# Patient Record
Sex: MALE | Race: WHITE
[De-identification: names, ages, dates, MRNs, and addresses within clinical notes are randomized per-mention and may not be internally consistent; named-entity substitution may affect disease eponyms.]

---

## 2018-05-04 ENCOUNTER — HOSPITAL ENCOUNTER (EMERGENCY)
Dept: HOSPITAL 74 - JER | Age: 43
Discharge: HOME | End: 2018-05-04
Payer: COMMERCIAL

## 2018-05-04 VITALS — TEMPERATURE: 98.8 F | HEART RATE: 76 BPM | DIASTOLIC BLOOD PRESSURE: 78 MMHG | SYSTOLIC BLOOD PRESSURE: 137 MMHG

## 2018-05-04 VITALS — BODY MASS INDEX: 29.9 KG/M2

## 2018-05-04 DIAGNOSIS — R16.0: ICD-10-CM

## 2018-05-04 DIAGNOSIS — R10.11: ICD-10-CM

## 2018-05-04 DIAGNOSIS — K76.0: ICD-10-CM
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
## 2018-05-04 DIAGNOSIS — F10.10: Primary | ICD-10-CM

## 2018-05-04 LAB
ALBUMIN SERPL-MCNC: 4.3 G/DL (ref 3.4–5)
ALP SERPL-CCNC: 120 U/L (ref 45–117)
ALT SERPL-CCNC: 263 U/L (ref 12–78)
ANION GAP SERPL CALC-SCNC: 13 MMOL/L (ref 8–16)
AST SERPL-CCNC: 634 U/L (ref 15–37)
BASOPHILS # BLD: 1 % (ref 0–2)
BILIRUB SERPL-MCNC: 1.2 MG/DL (ref 0.2–1)
BUN SERPL-MCNC: 7 MG/DL (ref 7–18)
CALCIUM SERPL-MCNC: 8.4 MG/DL (ref 8.5–10.1)
CHLORIDE SERPL-SCNC: 104 MMOL/L (ref 98–107)
CO2 SERPL-SCNC: 25 MMOL/L (ref 21–32)
CREAT SERPL-MCNC: 0.8 MG/DL (ref 0.7–1.3)
DEPRECATED RDW RBC AUTO: 17.4 % (ref 11.9–15.9)
EOSINOPHIL # BLD: 2 % (ref 0–4.5)
GLUCOSE SERPL-MCNC: 85 MG/DL (ref 74–106)
HCT VFR BLD CALC: 38.6 % (ref 35.4–49)
HGB BLD-MCNC: 13.4 GM/DL (ref 11.7–16.9)
LYMPHOCYTES # BLD: 34.2 % (ref 8–40)
MCH RBC QN AUTO: 33.5 PG (ref 25.7–33.7)
MCHC RBC AUTO-ENTMCNC: 34.7 G/DL (ref 32–35.9)
MCV RBC: 96.8 FL (ref 80–96)
MONOCYTES # BLD AUTO: 7.7 % (ref 3.8–10.2)
NEUTROPHILS # BLD: 55.1 % (ref 42.8–82.8)
PLATELET # BLD AUTO: 135 K/MM3 (ref 134–434)
PMV BLD: 8 FL (ref 7.5–11.1)
POTASSIUM SERPLBLD-SCNC: 3.8 MMOL/L (ref 3.5–5.1)
PROT SERPL-MCNC: 7.7 G/DL (ref 6.4–8.2)
RBC # BLD AUTO: 3.99 M/MM3 (ref 4–5.6)
SODIUM SERPL-SCNC: 142 MMOL/L (ref 136–145)
WBC # BLD AUTO: 3.8 K/MM3 (ref 4–10)

## 2018-05-04 PROCEDURE — 3E0337Z INTRODUCTION OF ELECTROLYTIC AND WATER BALANCE SUBSTANCE INTO PERIPHERAL VEIN, PERCUTANEOUS APPROACH: ICD-10-PCS

## 2018-05-04 NOTE — PDOC
History of Present Illness





- General


Chief Complaint: Pain


Stated Complaint: ABD PAIN


Time Seen by Provider: 05/04/18 17:17


History Source: Patient


Exam Limitations: No Limitations





- History of Present Illness


Initial Comments: 





CHIEF COMPLAINT:  43 y/o afebrile male, chronic alcoholic with PMH HTN (

uncontrolled) c/o abdominal pain today. 





HISTORY OF PRESENT ILLNESS:  The patient admits he's been drinking alcohol all 

day and has finished an entire bottle of vodka.  He states his stomach started 

hurting when he woke up this morning.  He denies f/c, n/v/d, CP, SOB, back pain

, hematuria, dysuria.  He states his last BM was this morning. 





Vital signs on arrival are 





REVIEW OF SYSTEMS:


GENERAL/CONSTITUTIONAL: No fever/chills. No weakness. No weight change.


HEAD, EYES, EARS, NOSE AND THROAT: No change in vision. No ear pain or 

discharge. No sore throat.


CARDIOVASCULAR: No chest pain or shortness of breath.


RESPIRATORY: No cough, wheezing, or hemoptysis.


GASTROINTESTINAL: +abdominal pain.  No nausea, vomiting, diarrhea, constipation.


GENITOURINARY: No dysuria, frequency, or change in urination.


MUSCULOSKELETAL: No joint or muscle swelling or pain. No neck or back pain.


SKIN: No rash or easy bruising.


NEUROLOGIC: No headache, vertigo, loss of consciousness, or loss of sensation.








PHYSICAL EXAM:


GENERAL: The patient is awake, intoxicated, in NAD or obvious discomfort.  He 

is ambulatory with normal gait. 


HEAD: Normal with no signs of trauma.


ENT: Pupils equal, round and reactive to light, extraocular movements intact, 

sclera anicteric, conjunctiva clear. Neck supple.


LUNGS: Clear to auscultation bilaterally. Normal excursion. No respiratory 

distress or use of accessory muscles.


CV: RRR, S1/S2, no MRG. Cap refill < 2 sec.


ABDOMEN: Soft, non-distended, minimal TTP of middle abdomen.  Negative early's 

sign.  No McBurney's point TTP.  No rebound or guarding.  Normal BS x 4.


EXTREMITIES: Normal range of motion, no edema.


NEUROLOGICAL: Normal speech, normal gait. CN II-XII grossly intact.


PSYCH: Normal mood, normal affect.


SKIN: Warm, dry, normal turgor, no rashes or lesions noted.











Past History





- Past Medical History


Allergies/Adverse Reactions: 


 Allergies











Allergy/AdvReac Type Severity Reaction Status Date / Time


 


No Known Allergies Allergy   Verified 05/04/18 18:06











Home Medications: 


Ambulatory Orders





Clonazepam [Klonopin] 1 mg PO DAILY 05/04/18 








Psychiatric Problems: Yes (ANXIETY/DEPRESSION)





- Surgical History


Abdominal Surgery: Yes


Appendectomy: Yes





- Suicide/Smoking/Psychosocial Hx


Smoking Status: Yes


Smoking History: Current every day smoker


Have you smoked in the past 12 months: No


Number of Cigarettes Smoked Daily: 20


Hx Alcohol Use: Yes (SOCIAL)


Substance Use Type: Alcohol





ED Treatment Course





- LABORATORY


CBC & Chemistry Diagram: 


 05/04/18 18:10





 05/04/18 18:10





Medical Decision Making





- Medical Decision Making





A/P:  43 y/o male, intoxicated, c/o abdominal pain today with benign abdominal 

exam.  Plan is as follows:





1. Labs


2. IV fluids


3. abd xray





The patient was signed out to [facility]














*DC/Admit/Observation/Transfer


Diagnosis at time of Disposition: 


 ETOH abuse, Abdominal pain








- Discharge Dispostion


Disposition: HOME


Condition at time of disposition: Stable





- Referrals


Referrals: 


Rick Bowen MD [Staff Physician] - 


Francisco Owen [Non Staff, Medical] - 





- Patient Instructions


Printed Discharge Instructions:  Blood Alcohol Level, DI for Abdominal Pain-

Adult


Additional Instructions: 


Follow with the gastroenterologist within 48 hours





Avoid alcohol





Abdominal ultrasound today does not show any gallstones or gallbladder infection





Return back to the ER for severe/persistent or worsening symptoms





- Post Discharge Activity

## 2018-05-04 NOTE — PDOC
*Physical Exam





- Vital Signs


 Last Vital Signs











Temp Pulse Resp BP Pulse Ox


 


 98.3 F   88   18   133/84   100 


 


 05/04/18 17:15  05/04/18 17:15  05/04/18 17:15  05/04/18 17:15  05/04/18 17:15














- Physical Exam


Comments: 





05/04/18 20:34


42-year-old male presents to the ER complaining of right upper abdominal 

discomfort after drinking an entire bottle of vodka today. Patient denies nausea

/vomiting, fever/chills, dizziness, lightheadedness, facial pains, neck pain/

stiffness, back pains, chest pain, shortness of breath, back pains, urinary 

symptoms.


Patient states he feels a bit better now.





 





ED Treatment Course





- LABORATORY


CBC & Chemistry Diagram: 


 05/04/18 18:10





 05/04/18 18:10





- ADDITIONAL ORDERS


Additional order review: 


 Laboratory  Results











  05/04/18 05/04/18





  18:10 18:10


 


Sodium   142


 


Potassium   3.8


 


Chloride   104


 


Carbon Dioxide   25


 


Anion Gap   13


 


BUN   7


 


Creatinine   0.8


 


Creat Clearance w eGFR   > 60


 


Random Glucose   85


 


Calcium   8.4 L


 


Total Bilirubin   1.2 H


 


AST   634 H


 


ALT   263 H


 


Alkaline Phosphatase   120 H


 


Total Protein   7.7


 


Albumin   4.3


 


Lipase  942 H 








 











  05/04/18





  18:10


 


RBC  3.99 L


 


MCV  96.8 H


 


MCHC  34.7


 


RDW  17.4 H


 


MPV  8.0


 


Neutrophils %  55.1


 


Lymphocytes %  34.2


 


Monocytes %  7.7


 


Eosinophils %  2.0


 


Basophils %  1.0














- RADIOLOGY


Radiograph Interpretation: 





05/04/18 20:35


Abdominal ultrasound: No sonographic evidence of cholelithiasis or acute 

cholecystitis. There is no definite biliary tract dilatation. Diffuse hepatic 

steatosis is noted. Minimal to mild associated hepatomegaly is seen.





- Medications


Given in the ED: 


ED Medications














Discontinued Medications














Generic Name Dose Route Start Last Admin





  Trade Name Freq  PRN Reason Stop Dose Admin


 


Sodium Chloride  1,000 mls @ 1,000 mls/hr  05/04/18 17:30  05/04/18 18:09





  Normal Saline -  IV  05/04/18 18:29  1,000 mls/hr





  ASDIR STA   Administration














Progress Note





- Progress Note


Progress Note: 





GENERAL:


Well developed, well nourished. Awake and alert. No acute distress.


HEENT:


Normocephalic, atraumatic. PERRLA, EOMI. No conjunctival pallor. Sclera are non-

icteric. Moist mucous membranes. Oropharynx is clear.


NECK: 


Supple. Full ROM. No JVD. Carotid pulses 2+ and symmetric, without bruits. No 

thyromegaly. No lymphadenopathy.


CARDIOVASCULAR:


Regular rate and rhythm. No murmurs, rubs, or gallops. Distal pulses are 2+ and 

symmetric. 


PULMONARY: 


No evidence of respiratory distress. Lungs clear to auscultation bilaterally. 

No wheezing, rales or rhonchi.


ABDOMINAL:


Soft. Non-tender. Non-distended. No rebound or guarding. No organomegaly. 

Normoactive bowel sounds. 


MUSCULOSKELETAL 


Normal range of motion at all joints. No bony deformities or tenderness. No CVA 

tenderness.


EXTREMITIES: 


No cyanosis. No clubbing. No edema. No calf tenderness.


SKIN: 


Warm and dry. Normal capillary refill. No rashes. No jaundice. 


NEUROLOGICAL: 


Alert, awake, appropriate. Cranial nerves 2-12 intact. No deficits to light 

touch and temperature in face, upper extremities and lower extremities. No 

motor deficits in the in face, upper extremities and lower extremities. 

Normoreflexic in the upper and lower extremities. Normal speech. Toes are down-

going bilaterally. Gait is normal without ataxia.


PSYCHIATRIC: 


Cooperative. Good eye contact. Appropriate mood and affect.





*DC/Admit/Observation/Transfer


Diagnosis at time of Disposition: 


 ETOH abuse





Abdominal pain


Qualifiers:


 Abdominal location: right upper quadrant Qualified Code(s): R10.11 - Right 

upper quadrant pain








- Discharge Dispostion


Disposition: HOME


Condition at time of disposition: Stable


Admit: No





- Referrals


Referrals: 


Francisco Owen [Non Staff, Medical] - 


Rick Bowen MD [Staff Physician] - 





- Patient Instructions


Printed Discharge Instructions:  Blood Alcohol Level, DI for Abdominal Pain-

Adult


Additional Instructions: 


Follow with the gastroenterologist within 48 hours





Avoid alcohol





Abdominal ultrasound today does not show any gallstones or gallbladder infection





Return back to the ER for severe/persistent or worsening symptoms





- Post Discharge Activity

## 2018-05-04 NOTE — PDOC
ED Treatment Course





- LABORATORY


CBC & Chemistry Diagram: 


 05/04/18 18:10





 05/04/18 18:10





Medical Decision Making





- Medical Decision Making





05/04/18 18:02





Mr Moe is a 43 y/o male, HTN, alcoholism who presents to the ER with a 

complaint of abdominal pain today. 


Drank an entire bottle of vodka


No fevers no chills





Pt seen by Midlevel Provider under my direct supervision


Ancillary studies pending


I agree with plan as outlined by Midlevel Provider


EKG:


SR rate of 84 bpm, axis nml, no st elevations or depressions


Pt signed out to overnight [facility]











*DC/Admit/Observation/Transfer


Diagnosis at time of Disposition: 


 ETOH abuse, Abdominal pain








- Discharge Dispostion


Disposition: HOME


Condition at time of disposition: Stable





- Referrals


Referrals: 


Rick Bowen MD [Staff Physician] - 


Francisco Owen [Non Staff, Medical] - 





- Patient Instructions


Printed Discharge Instructions:  Blood Alcohol Level, DI for Abdominal Pain-

Adult


Additional Instructions: 


Follow with the gastroenterologist within 48 hours





Avoid alcohol





Abdominal ultrasound today does not show any gallstones or gallbladder infection





Return back to the ER for severe/persistent or worsening symptoms





- Post Discharge Activity

## 2018-05-09 NOTE — EKG
Test Reason : 

Blood Pressure : ***/*** mmHG

Vent. Rate : 084 BPM     Atrial Rate : 084 BPM

   P-R Int : 116 ms          QRS Dur : 088 ms

    QT Int : 358 ms       P-R-T Axes : 008 077 067 degrees

   QTc Int : 423 ms

 

NORMAL SINUS RHYTHM

SEPTAL INFARCT , AGE UNDETERMINED

ABNORMAL ECG

WHEN COMPARED WITH ECG OF 09-FEB-2008 10:27,

SEPTAL INFARCT IS NOW PRESENT

Confirmed by MARGARETH ADAMS, REEMA ([id number]) on 5/9/2018 1:15:33 PM

 

Referred By:             Confirmed By:REEMA ZULUAGA MD

## 2019-03-09 ENCOUNTER — HOSPITAL ENCOUNTER (EMERGENCY)
Dept: HOSPITAL 74 - JER | Age: 44
Discharge: LEFT BEFORE DISCHARGE | End: 2019-03-09
Payer: COMMERCIAL

## 2019-03-09 VITALS — SYSTOLIC BLOOD PRESSURE: 140 MMHG | DIASTOLIC BLOOD PRESSURE: 85 MMHG

## 2019-03-09 VITALS — TEMPERATURE: 98.9 F

## 2019-03-09 VITALS — HEART RATE: 98 BPM

## 2019-03-09 VITALS — BODY MASS INDEX: 25.7 KG/M2

## 2019-03-09 DIAGNOSIS — F10.20: ICD-10-CM

## 2019-03-09 DIAGNOSIS — G40.509: Primary | ICD-10-CM

## 2019-03-09 LAB
ALBUMIN SERPL-MCNC: 4.4 G/DL (ref 3.4–5)
ALP SERPL-CCNC: 67 U/L (ref 45–117)
ALT SERPL-CCNC: 63 U/L (ref 13–61)
ANION GAP SERPL CALC-SCNC: 11 MMOL/L (ref 8–16)
AST SERPL-CCNC: 71 U/L (ref 15–37)
BASOPHILS # BLD: 0.5 % (ref 0–2)
BILIRUB SERPL-MCNC: 0.5 MG/DL (ref 0.2–1)
BUN SERPL-MCNC: 8 MG/DL (ref 7–18)
CALCIUM SERPL-MCNC: 8.9 MG/DL (ref 8.5–10.1)
CHLORIDE SERPL-SCNC: 104 MMOL/L (ref 98–107)
CO2 SERPL-SCNC: 25 MMOL/L (ref 21–32)
CREAT SERPL-MCNC: 0.8 MG/DL (ref 0.55–1.3)
DEPRECATED RDW RBC AUTO: 14.4 % (ref 11.9–15.9)
EOSINOPHIL # BLD: 1.9 % (ref 0–4.5)
GLUCOSE SERPL-MCNC: 79 MG/DL (ref 74–106)
HCT VFR BLD CALC: 48.5 % (ref 35.4–49)
HGB BLD-MCNC: 17.1 GM/DL (ref 11.7–16.9)
LIPASE SERPL-CCNC: 258 U/L (ref 73–393)
LYMPHOCYTES # BLD: 27.2 % (ref 8–40)
MCH RBC QN AUTO: 34.9 PG (ref 25.7–33.7)
MCHC RBC AUTO-ENTMCNC: 35.2 G/DL (ref 32–35.9)
MCV RBC: 99.1 FL (ref 80–96)
MONOCYTES # BLD AUTO: 8.5 % (ref 3.8–10.2)
NEUTROPHILS # BLD: 61.9 % (ref 42.8–82.8)
PLATELET # BLD AUTO: 220 K/MM3 (ref 134–434)
PMV BLD: 7.9 FL (ref 7.5–11.1)
POTASSIUM SERPLBLD-SCNC: 3.7 MMOL/L (ref 3.5–5.1)
PROT SERPL-MCNC: 8.1 G/DL (ref 6.4–8.2)
RBC # BLD AUTO: 4.89 M/MM3 (ref 4–5.6)
SODIUM SERPL-SCNC: 139 MMOL/L (ref 136–145)
WBC # BLD AUTO: 6.6 K/MM3 (ref 4–10)

## 2019-03-09 NOTE — PDOC
History of Present Illness





- General


History Source: Patient


Exam Limitations: No Limitations





- History of Present Illness


Initial Comments: 





03/09/19 18:16


The patient is a 43 year old male, with a significant PMH of seizures, anxiety 

and depression, who presents to the emergency department for evaluation of 1 

episode seizure that occurred today. The patient states he was trying to detox 

from alcohol and went to his physician where he thinks he was prescribed 

lorazepam. Patient states he took the medication today and drank 1 pint of Benji 

Dotson  today when the seizure occurred. He endorses associated symptoms of 

abdomen pain, decrease in appetite and experienced 1 episode of vomiting. The 

patient's mother reports that patient is an alcoholic  (drinks 2 pints of Benji 

Uriel a day ) and has experienced seizures in the past secondary to alcohol.  





The patient denies chest pain, shortness of breath, headache and dizziness.


Denies fever, chills, diarrhea and constipation.


Denies dysuria, frequency, urgency and hematuria.





Allergies: NKDA


Past surgical history: Abdominal surgery and appendectomy 


Social history: Current everyday smoker (20 cigarettes daily)  and admits 

alcohol use ( 2 pints Benji Uriel every day)


PCP: Arlene Finn 











<Jaylen Hernandez - Last Filed: 03/09/19 18:19>





<Hilaria Smith - Last Filed: 03/10/19 20:20>





- General


Chief Complaint: Seizure


Stated Complaint: SEIZURE


Time Seen by Provider: 03/09/19 17:25





Past History





<Jaylen Hernandez - Last Filed: 03/09/19 18:19>





- Past Medical History


COPD: No


Psychiatric Problems: Yes (ANXIETY/DEPRESSION)


Seizures: Yes (last seisure 03/09/2019)





- Surgical History


Abdominal Surgery: Yes


Appendectomy: Yes





- Suicide/Smoking/Psychosocial Hx


Smoking Status: Yes


Smoking History: Current every day smoker


Have you smoked in the past 12 months: Yes


Number of Cigarettes Smoked Daily: 20


Information on smoking cessation initiated: No


Hx Alcohol Use: Yes


Drug/Substance Use Hx: Yes


Substance Use Type: Alcohol





<Hilaria Smtih - Last Filed: 03/10/19 20:20>





- Past Medical History


Allergies/Adverse Reactions: 


 Allergies











Allergy/AdvReac Type Severity Reaction Status Date / Time


 


No Known Allergies Allergy   Verified 03/09/19 16:59











Home Medications: 


Ambulatory Orders





Clonazepam [Klonopin] 1 mg PO DAILY 05/04/18 











**Review of Systems





- Review of Systems


Able to Perform ROS?: Yes


Comments:: 





03/09/19 18:17


GENERAL/CONSTITUTIONAL: No fever or chills. No weakness.


HEAD, EYES, EARS, NOSE AND THROAT: No change in vision. No ear pain or 

discharge. No sore throat.


CARDIOVASCULAR: No chest pain or shortness of breath.


RESPIRATORY: No cough, wheezing, or hemoptysis.


GASTROINTESTINAL:+Abdomen pain, +vomiting . No diarrhea or constipation.


GENITOURINARY: No dysuria, frequency, or change in urination.


MUSCULOSKELETAL: No joint or muscle swelling or pain. No neck or back pain.


SKIN: No rash


NEUROLOGIC: +seizure.  No headache, vertigo, loss of consciousness, or change 

in strength/sensation.


ENDOCRINE: No increased thirst. No abnormal weight change.


HEMATOLOGIC/LYMPHATIC: No anemia, easy bleeding, or history of blood clots.


ALLERGIC/IMMUNOLOGIC: No hives or skin allergy.








<Jaylen Hernandez - Last Filed: 03/09/19 18:19>





*Physical Exam





- Vital Signs


 Last Vital Signs











Temp Pulse Resp BP Pulse Ox


 


 98.9 F   98 H  18   143/96   99 


 


 03/09/19 16:50  03/09/19 16:50  03/09/19 16:50  03/09/19 16:50  03/09/19 16:50














- Physical Exam


Comments: 





03/09/19 18:18


GENERAL: Awake, alert, and fully oriented, in no acute distress


HEAD: No signs of trauma


EYES: PERRLA, EOMI, sclera anicteric, conjunctiva clear


ENT: Auricles normal inspection, hearing grossly normal, nares patent, 

oropharynx clear without exudates. Moist mucosa


NECK: Normal ROM, supple, no lymphadenopathy, JVD, or masses


LUNGS: Breath sounds equal, clear to auscultation bilaterally.  No wheezes, and 

no crackles


HEART: Regular rate and rhythm, normal S1 and S2, no murmurs, rubs or gallops


ABDOMEN: +Mild LUQ pain to palpation. Normoactive bowel sounds.  No guarding, 

no rebound.  No masses


EXTREMITIES: Normal range of motion, no edema.  No clubbing or cyanosis. No 

cords, erythema, or tenderness


NEUROLOGICAL: Cranial nerves II through XII grossly intact.  Normal speech, 

normal gait


SKIN: Warm, Dry, normal turgor, no rashes or lesions noted.

















<Jaylen Hernandez - Last Filed: 03/09/19 18:19>





- Vital Signs


 Last Vital Signs











Temp Pulse Resp BP Pulse Ox


 


 98.9 F   98 H  18   143/96   99 


 


 03/09/19 16:50  03/09/19 16:50  03/09/19 16:50  03/09/19 16:50  03/09/19 16:50














<Hilaria Smith - Last Filed: 03/10/19 20:20>





Moderate Sedation





- Procedure Monitoring


Vital Signs: 


Procedure Monitoring Vital Signs











Temperature  98.9 F   03/09/19 16:50


 


Pulse Rate  98 H  03/09/19 16:50


 


Respiratory Rate  18   03/09/19 16:50


 


Blood Pressure  143/96   03/09/19 16:50


 


O2 Sat by Pulse Oximetry (%)  99   03/09/19 16:50











<Jaylen Hernandez - Last Filed: 03/09/19 18:19>





- Procedure Monitoring


Vital Signs: 


Procedure Monitoring Vital Signs











Temperature  98.9 F   03/09/19 16:50


 


Pulse Rate  98 H  03/09/19 16:50


 


Respiratory Rate  18   03/09/19 16:50


 


Blood Pressure  143/96   03/09/19 16:50


 


O2 Sat by Pulse Oximetry (%)  99   03/09/19 16:50











<Hilaria Smith - Last Filed: 03/10/19 20:20>





ED Treatment Course





- Medications


Given in the ED: 


ED Medications














Discontinued Medications














Generic Name Dose Route Start Last Admin





  Trade Name Romainq  PRN Reason Stop Dose Admin


 


Chlordiazepoxide HCl  25 mg  03/09/19 17:35  03/09/19 18:06





  Librium -  PO  03/09/19 17:36  25 mg





  ONCE ONE   Administration





     





     





     





     


 


Sodium Chloride  1,000 ml  03/09/19 17:36  03/09/19 18:06





  Normal Saline -  IV  03/09/19 17:37  1,000 ml





  ONCE ONE   Administration





     





     





     





     














<Jaylen Hernandez - Last Filed: 03/09/19 18:19>





- LABORATORY


CBC & Chemistry Diagram: 


 03/09/19 18:10





 03/09/19 18:10





<Hilaria Smith - Last Filed: 03/10/19 20:20>





Medical Decision Making





- Medical Decision Making





03/09/19 18:38


Pt presents to the ED complaining of seizure today.  Pt is an alcoholic who was 

recently started on an unknown medication for outpatient detox by his PMD.  All 

seizures in the past have been secondary to ETOH withdrawal.  No signs of 

active withdrawal at this time, but given history of seizure, will treat with 

librium.  If patient's symptoms are controlled by librium, will transfer to 

[facility] for in patient detox.  If he requires IV medications, will admit to 

medicine. 


03/09/19 20:34





Still has no signs of active withdrawal.  Case discussed with [facility], pt is 

accepted for inpatient detox tomorrow AM.





Send to [facility].  


cell Phone [phone]





<Hilaria Smith - Last Filed: 03/10/19 20:20>





*DC/Admit/Observation/Transfer





- Attestations


Scribe Attestion: 





03/09/19 18:18





Documentation prepared by Jaylen Hernandez, acting as medical scribe for Hilaria Smith MD. 





<Jaylen Hernandez - Last Filed: 03/09/19 18:19>





<Hilaria Smith - Last Filed: 03/10/19 20:20>


Diagnosis at time of Disposition: 


 Seizure








- Discharge Dispostion


Disposition: ELOPED





- Referrals


Referrals: 


Arlene Finn MD [Primary Care Provider] - 





- Patient Instructions





- Post Discharge Activity

## 2019-03-11 ENCOUNTER — HOSPITAL ENCOUNTER (EMERGENCY)
Dept: HOSPITAL 74 - JER | Age: 44
Discharge: HOME | End: 2019-03-11
Payer: COMMERCIAL

## 2019-03-11 VITALS — SYSTOLIC BLOOD PRESSURE: 121 MMHG | DIASTOLIC BLOOD PRESSURE: 83 MMHG | HEART RATE: 102 BPM | TEMPERATURE: 98.4 F

## 2019-03-11 VITALS — BODY MASS INDEX: 25.7 KG/M2

## 2019-03-11 DIAGNOSIS — G40.509: ICD-10-CM

## 2019-03-11 DIAGNOSIS — F10.120: Primary | ICD-10-CM

## 2019-03-11 NOTE — PDOC
Rapid Medical Evaluation


Time Seen by Provider: 03/11/19 15:02


Medical Evaluation: 


 Allergies











Allergy/AdvReac Type Severity Reaction Status Date / Time


 


No Known Allergies Allergy   Verified 03/09/19 16:59











03/11/19 15:03


I have performed a brief in-person evaluation of this patient. 





The patient presents with a chief complaint of





alcohol intoxication and possible seizure.  Patient seen here last week 


for intoxication and seizure, walked out because he states no one was tending


to him.  He was called this am and states he would like help, to go to rehab.  

Thinks 


he possibly had a seizure today.  Admits to 1 pint of alcohol today





Pertinent physical exam findings


NAD


flat affect


even and unlabored breathing 


+right upper and lower abdominal tenderness





I have ordered the following


labs 


The patient will proceed to the ED for further evaluation.

## 2019-03-11 NOTE — PDOC
History of Present Illness





- General


Chief Complaint: Seizure


Stated Complaint: SEIZURE


Time Seen by Provider: 03/11/19 15:02





- History of Present Illness


Initial Comments: 


Eddie Moe is a 42yo man with a PMH of alcoholism, depression, and 

alcoholic seizure who presents requesting alcohol detox today. His mother is at 

bedside. 





Mr Moe states that he was here on Saturday but "no one saw him" so he "got 

fed up" and left the ED. His mother states that he was here for "8 hours and no 

one would even give him a glass of water." He went home and continued to drink 

his usual 2 pints per day. He is concerned that he had a seizure this morning 

becuase he appears to have lost some time. He was drinking, about a pint total 

so far, and had started to smoke a cigarette. The next thing he remembers, his 

cigarette had gone out and he was laying on the floor. However, he denies any 

injury, head trauma, known mechanical fall, bowel/bladder incontinence or any 

other symptoms. The event was unwitnessed and occurred several hours ago. 





Mr Moe denies any other recent symptoms. He states that in the past, he 

used to drink at least a large bottle of liquor daily (1L bottle), but he had 

cut back to 1 pint per day. This has slowly increased over time to 

approximately 2 pints per day at this point. He now starts to feel shaky when 

he wakes up in the morning after not drinking for 7-8 hours. He has never gone 

to rehab in the past, though he does not a history of drug abuse until 2 years 

ago (coke and "pills").








Past History





- Past Medical History


Allergies/Adverse Reactions: 


 Allergies











Allergy/AdvReac Type Severity Reaction Status Date / Time


 


No Known Allergies Allergy   Verified 03/11/19 15:05











Home Medications: 


Ambulatory Orders





Clonazepam [Klonopin] 1 mg PO DAILY 05/04/18 








COPD: No


Psychiatric Problems: Yes (ANXIETY/DEPRESSION)


Seizures: Yes (last seisure 03/09/2019)





- Surgical History


Abdominal Surgery: Yes


Appendectomy: Yes





- Suicide/Smoking/Psychosocial Hx


Smoking Status: Yes


Smoking History: Never smoked


Have you smoked in the past 12 months: Yes


Number of Cigarettes Smoked Daily: 20


Information on smoking cessation initiated: No


Hx Alcohol Use: No


Drug/Substance Use Hx: No


Substance Use Type: Alcohol





**Review of Systems





- Review of Systems


Comments:: 


General: No fevers, no chills, no weight or appetite change, no malaise


HEENT: No changes in vision, no changes in hearing, no congestion, no sore 

throat


CV: No chest pain, no palpitations, no LE edema


Pulm: No SOB, no cough, no wheezing


GI: No nausea or vomiting, no change in bowel habits, no melena


: No frequency, no urgency, no dysuria


Musc: No back pain, no joint swelling, no recent injury


Skin: No rash, no lesions, no erythema


Endo: No excessive thirst, no heat/cold intolerance


Heme: No unusual bruising or bleeding, no swollen glands


Neuro: No syncope, no numbness/tingling, no focal weakness


Vasc: No claudication


Psych: No recent change in mood, no SI or HI. h/o alcohol abuse











*Physical Exam





- Vital Signs


 Last Vital Signs











Temp Pulse Resp BP Pulse Ox


 


 98.4 F   102 H  17   121/83   97 


 


 03/11/19 15:05  03/11/19 15:05  03/11/19 15:05  03/11/19 15:05  03/11/19 15:05














- Physical Exam


Comments: 


General: No acute distress. Smells strongly of alcohol and cigarettes. 


HEENT: PERRL, EOMI, MMM, voice normal, normal neck ROM, no LAD


Cards: RRR, no murmur appreciated


Pulm: Comfortable on room air, clear to auscultation bilaterally


Abd: Soft, nontender, nondistended


Ext: Atraumatic. No LE edema. ROM intact. Strength 5/5 and equal bilaterally


Vasc: Extremities WWP.


Skin: Normal color, no rashes or lesions


Neuro: A&Ox3, CN grossly intact, normal speech, motor/sensory grossly intact 

and symmetric


Psych: Mood appropriate to situation











Moderate Sedation





- Procedure Monitoring


Vital Signs: 


Procedure Monitoring Vital Signs











Temperature  98.4 F   03/11/19 15:05


 


Pulse Rate  102 H  03/11/19 15:05


 


Respiratory Rate  17   03/11/19 15:05


 


Blood Pressure  121/83   03/11/19 15:05


 


O2 Sat by Pulse Oximetry (%)  97   03/11/19 15:05











Medical Decision Making





- Medical Decision Making





03/11/19 15:50


Eddie Moe is a 42yo man with a PMH of alcoholism, depression, and seizure 

secondary to alcohol who presents today requesting detox. He also states that 

he feels he may have had a seizure today but is not sure. He suspects a seizure 

only because his cigarette went out while he was drinking. He felt that he lost 

time and must have had a seizure. However, he has no injury and did not have 

any incontinence or other associated symptoms. 


- Appears stable, VSS, observed ambulating without difficulty. No current 

physical complaints. Medically cleared for d/c to detox. 


- No indication that Mr Moe had a seizure. More likely he did not notice 

his cigarette going out due to drinking 1 pint of alcohol this morning.


- Called [facility]. There are detox beds available, and they do not need labs 

drawn prior to transfer


- Will contact security to take him to detox.





Discussed with Dr Baltazar Sargent


PGY1











*DC/Admit/Observation/Transfer


Diagnosis at time of Disposition: 


 ETOH abuse








- Discharge Dispostion


Disposition: HOME


Condition at time of disposition: Stable


Decision to Admit order: No





- Referrals





- Patient Instructions


Printed Discharge Instructions:  DI for Alcohol Abuse


Additional Instructions: 


Discharge Instructions:


You were seen in the emergency department for alcohol abuse.


You were discharged so that you can go to detox. It is strongly recommended 

that you also go to rehab following detox. 


Please proceed directly to [facility] for detox. 





- Post Discharge Activity

## 2019-03-11 NOTE — PDOC
Attending Attestation





- Resident


Resident Name: RosettaJoselyn





- ED Attending Attestation


I have performed the following: I have examined & evaluated the patient, The 

case was reviewed & discussed with the resident, I agree w/resident's findings 

& plan, Exceptions are as noted





- HPI


HPI: 





44 yo M presents s/p suspected seizure. He has history of EtOH abuse, prior 

history of withdrawal seizures. He has gone through detox in the past, but 

never rehab. He typically drinks heavily every day, develops seizures when he 

stops. Denies any tremors at present.








- Physicial Exam


PE: 





GENERAL: Awake, alert, and fully oriented, in no acute distress


HEAD: No signs of trauma


EYES: PERRLA, EOMI, sclera anicteric, conjunctiva clear


ENT: Auricles normal inspection, hearing grossly normal, nares patent, 

oropharynx clear without exudates. Moist mucosa. No tongue fasciculations.


NECK: Normal ROM, supple, no lymphadenopathy, JVD, or masses


LUNGS: Breath sounds equal, clear to auscultation bilaterally.  No wheezes, and 

no crackles


HEART: Regular rate and rhythm, normal S1 and S2, no murmurs, rubs or gallops


ABDOMEN: Soft, nontender, normoactive bowel sounds.  No guarding, no rebound.  

No masses


EXTREMITIES: Normal range of motion, no edema.  No clubbing or cyanosis. No 

cords, erythema, or tenderness. No tremors. 


NEUROLOGICAL: Cranial nerves II through XII grossly intact.  Normal speech, 

normal gait. Motor and sensation intact


SKIN: Warm, Dry, normal turgor, no rashes or lesions noted.








- Medical Decision Making





Pt with no signs of withdrawal at present (however, he was drinking just PTA). 

Stable gait in ED. Agrees to go to [facility] for detox/rehab.

## 2020-01-02 ENCOUNTER — HOSPITAL ENCOUNTER (INPATIENT)
Dept: HOSPITAL 74 - JER | Age: 45
LOS: 4 days | Discharge: HOME | End: 2020-01-06
Attending: FAMILY MEDICINE | Admitting: FAMILY MEDICINE
Payer: COMMERCIAL

## 2020-01-02 VITALS — BODY MASS INDEX: 25.3 KG/M2

## 2020-01-02 DIAGNOSIS — G35: ICD-10-CM
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
## 2020-01-02 DIAGNOSIS — F10.230: ICD-10-CM

## 2020-01-02 DIAGNOSIS — F10.220: Primary | ICD-10-CM

## 2020-01-02 DIAGNOSIS — F17.210: ICD-10-CM

## 2020-01-02 DIAGNOSIS — F41.8: ICD-10-CM

## 2020-01-02 LAB
ALBUMIN SERPL-MCNC: 4.5 G/DL (ref 3.4–5)
ALP SERPL-CCNC: 71 U/L (ref 45–117)
ALT SERPL-CCNC: 29 U/L (ref 13–61)
AMPHET UR-MCNC: NEGATIVE NG/ML
ANION GAP SERPL CALC-SCNC: 8 MMOL/L (ref 8–16)
AST SERPL-CCNC: 22 U/L (ref 15–37)
BARBITURATES UR-MCNC: NEGATIVE NG/ML
BENZODIAZ UR SCN-MCNC: NEGATIVE NG/ML
BILIRUB SERPL-MCNC: 0.5 MG/DL (ref 0.2–1)
BUN SERPL-MCNC: 7.6 MG/DL (ref 7–18)
CALCIUM SERPL-MCNC: 9.1 MG/DL (ref 8.5–10.1)
CHLORIDE SERPL-SCNC: 106 MMOL/L (ref 98–107)
CO2 SERPL-SCNC: 29 MMOL/L (ref 21–32)
COCAINE UR-MCNC: NEGATIVE NG/ML
CREAT SERPL-MCNC: 0.9 MG/DL (ref 0.55–1.3)
DEPRECATED RDW RBC AUTO: 15.4 % (ref 11.9–15.9)
GLUCOSE SERPL-MCNC: 85 MG/DL (ref 74–106)
HCT VFR BLD CALC: 44.3 % (ref 35.4–49)
HGB BLD-MCNC: 15.1 GM/DL (ref 11.7–16.9)
MCH RBC QN AUTO: 32 PG (ref 25.7–33.7)
MCHC RBC AUTO-ENTMCNC: 34.2 G/DL (ref 32–35.9)
MCV RBC: 93.8 FL (ref 80–96)
METHADONE UR-MCNC: NEGATIVE NG/ML
OPIATES UR QL SCN: NEGATIVE NG/ML
PCP UR QL SCN: NEGATIVE NG/ML
PLATELET # BLD AUTO: 218 K/MM3 (ref 134–434)
PMV BLD: 8.4 FL (ref 7.5–11.1)
POTASSIUM SERPLBLD-SCNC: 3.9 MMOL/L (ref 3.5–5.1)
PROT SERPL-MCNC: 8.1 G/DL (ref 6.4–8.2)
RBC # BLD AUTO: 4.72 M/MM3 (ref 4–5.6)
SODIUM SERPL-SCNC: 142 MMOL/L (ref 136–145)
WBC # BLD AUTO: 7 K/MM3 (ref 4–10)

## 2020-01-02 RX ADMIN — NICOTINE SCH MG: 7 PATCH TRANSDERMAL at 22:48

## 2020-01-02 NOTE — PDOC
Documentation entered by Rosemary Yun SCRIBE, acting as scribe for Carolynn Workman DO.








Carolynn Workman, DO:  This documentation has been prepared by the Jama marquez Joy, SCRIBE, under my direction and personally reviewed by me in its entirety.  

I confirm that the documentation accurately reflects all work, treatment, 

procedures, and medical decision making performed by me.  





Attending Attestation





- Resident


Resident Name: Cheikh Andino





- ED Attending Attestation


I have performed the following: I have examined & evaluated the patient, The 

case was reviewed & discussed with the resident, I agree w/resident's findings 

& plan, Exceptions are as noted





- HPI


HPI: 





01/02/20 18:57


The patient is a 44 year old male with significant past medical history of 

alcoholism, depression, and alcoholic seizure who presents to the ED with need 

of alcohol detox. As per patient, he has been drinking at least a pint per day 

for the last x3 weeks. Patient was seen by Dr. Finn and was advised to come 

to the ED for alcohol detox.





Allergies: NKA





- Physicial Exam


PE: 





01/02/20 19:20


Gen: aaox3, anxious appearing


heent: MMM, no tongue fasciculations


neck: supple


heart: +s1s2 reg


lungs: cta b/l


abd: soft, nt/nd +bs


ext: no c/c/e, mild hand tremors


neuro: cn ii-xii grossly intact, moves all extremities, muscle strength 5/5 UE 

and LE, decreased sensation LUE and LLE





- Medical Decision Making





01/02/20 19:23


a/p: 43yo male with new dx of MS 2 weeks ago at [facility] with recent 

etoh abuse- pt drinking a pint of alcohol a day x 2 weeks requesting detox


-pt also states he feels his numbness is worsening


-pt was at Dr. Finn office and told to come to the ER for admission


-pt states last drink was today


-hx of alcohol abuse in the past


-will send labs, ekg, ativan, banana bag


-will monitor and reassess


01/02/20 19:43


cxr clear


01/02/20 20:28


pt with etoh >200


labs reviewed


microblog sent to [facility] for admission


01/02/20 21:01


resident discussed the case with [facility] who accept pt to service

## 2020-01-02 NOTE — PDOC
Rapid Medical Evaluation


Chief Complaint: Alcohol intoxication


Time Seen by Provider: 01/02/20 15:35


Medical Evaluation: 


 Allergies











Allergy/AdvReac Type Severity Reaction Status Date / Time


 


No Known Allergies Allergy   Verified 03/11/19 15:05











01/02/20 15:36


I have performed a brief in-person evaluation of this patient.





The patient presents with a chief complaint of: h/o alcohol abuse present due 

to his PCP Dr. Finn advised him to come to ED for intoxication. pt report 

last alcohol 10 mins ago. pt drinks 2 pints of vodka a day according to pt. pt 

wants detox





Pertinent physical exam findings: A&O in NAD





I have ordered the following: nothing





The patient will proceed to the ED for further evaluation.











**Discharge Disposition





- Diagnosis


 ETOH abuse








- Discharge Dispostion


Condition at time of disposition: Stable





- Referrals





- Patient Instructions





- Post Discharge Activity

## 2020-01-02 NOTE — HP
Admitting History and Physical





- Primary Care Physician


PCP: Dr. Finn 





- Admission


Chief Complaint: Alcohol abuse


History of Present Illness: 





44 year old male with PMHx of  EtOH use disorder, depression, newly diagnosed 

MS presents referred by Dr. Finn for admission for detox, further inpatient 

neuro management of newly diagnosed MS. According to patient he has been 

drinking 1 pint of vodka daily for past 2 weeks after being diagnosed with MS, 

now c/o of hand shaking, nausea, anxious, feeling urge to drink interested in 

detox. Patient denies SOB/CP, V/C/D, no urinary symptoms. 





History Source: Patient


Limitations to Obtaining History: No Limitations





- Past Medical History


CNS: Yes: Multiple Sclerosis


Psych: Yes: Addictions (alcohol abuse), Anxiety, Depression





- Past Surgical History


Past Surgical History: Yes: Appendectomy





- Smoking History


Smoking history: Current every day smoker


Have you smoked in the past 12 months: Yes


Aproximately how many cigarettes per day: 20





- Alcohol/Substance Use


Hx Alcohol Use: Yes


History of Substance Use: reports: None





- Social History


ADL: Independent


History of Recent Travel: No





Home Medications





- Allergies


Allergies/Adverse Reactions: 


 Allergies











Allergy/AdvReac Type Severity Reaction Status Date / Time


 


No Known Allergies Allergy   Verified 01/02/20 15:38














- Home Medications


Home Medications: 


Ambulatory Orders





Clonazepam [Klonopin] 1 mg PO DAILY 05/04/18 











Family Medical History


Family History: Denies





Review of Systems





- Review of Systems


Constitutional: reports: No Symptoms


Eyes: reports: No Symptoms


HENT: reports: No Symptoms


Neck: reports: No Symptoms


Cardiovascular: reports: No Symptoms


Respiratory: reports: No Symptoms


Gastrointestinal: reports: Nausea


Genitourinary: reports: No Symptoms


Musculoskeletal: reports: No Symptoms


Integumentary: reports: No Symptoms


Neurological: reports: Numbness, Parasthesia, Tremors


Endocrine: reports: No Symptoms


Hematology/Lymphatic: reports: No Symptoms


Psychiatric: reports: Depression





Physical Examination


Vital Signs: 


 Vital Signs











Temperature  98 F   01/02/20 15:32


 


Pulse Rate  111 H  01/02/20 15:32


 


Respiratory Rate  18   01/02/20 15:32


 


Blood Pressure  137/83   01/02/20 15:32


 


O2 Sat by Pulse Oximetry (%)  99   01/02/20 15:32











Constitutional: Yes: Anxious, Mild Distress


Eyes: Yes: Conjunctiva Clear, EOM Intact


HENT: Yes: Atraumatic, Normocephalic


Neck: Yes: Supple, Trachea Midline


Cardiovascular: Yes: Regular Rate and Rhythm


Respiratory: Yes: Regular, CTA Bilaterally


Gastrointestinal: Yes: Normal Bowel Sounds, Soft


Renal/: Yes: WNL


Musculoskeletal: Yes: WNL


Extremities: Yes: WNL


Edema: No


Peripheral Pulses WNL: Yes


Integumentary: Yes: WNL


Neurological: Yes: Numbness, Tremors, Weakness


Labs: 


 CBC, BMP





 01/02/20 19:36 





 01/02/20 19:36 











Imaging





- Results


Chest X-ray: Report Reviewed (no acute finding)


Other: Report Reviewed (cbc, cmp : wnl  etoh: .200)





Problem List





- Problems


(1) ETOH abuse


Code(s): F10.10 - ALCOHOL ABUSE, UNCOMPLICATED   





(2) Multiple sclerosis


Code(s): G35 - MULTIPLE SCLEROSIS   





(3) Depression


Code(s): F32.9 - MAJOR DEPRESSIVE DISORDER, SINGLE EPISODE, UNSPECIFIED   





(4) Anxiety


Code(s): F41.9 - ANXIETY DISORDER, UNSPECIFIED   





Assessment/Plan





44 year old with PMhx of depression, anxiety, new diagnosis of MS 2 weeks ago 

at [facility] with 2 weeks of etoh abuse. As per pateint has been 

drinking nonstop daily 1 pint of vodka since diagnosed with MS. Here requesting 

detox.  








#ETOH abuse


- Etoh: >200


- given banana bag, ativan in ED


- continue with banana bag 


- follow up Mg+ level


- continue with ativan protocol


- follow up detox  


- safety/fall precaution 


- seizure precaution 


- monitor for DTs 





#Depression/ anxiety 


- continue with Ativan  


- psych follow up 


- safety/fall precaution 





#New dx of MS 


- no home meds


- follow up neurology 





#Nicotine dependence


- continue with nicotine patch 








VTE: heparin SQ


FEN: IVF, monitor lytes, regular diet 


Dispo: Med-surg 





Visit type





- Emergency Visit


Emergency Visit: Yes


ED Registration Date: 01/02/20


Care time: The patient presented to the Emergency Department on the above date 

and was hospitalized for further evaluation of their emergent condition.





- New Patient


This patient is new to me today: Yes


Date on this admission: 01/02/20





- Critical Care


Critical Care patient: No

## 2020-01-02 NOTE — PDOC
Documentation entered by Rosemary Yun SCRIBE, acting as scribe for Carolynn Workman DO.








Carolynn Workman, DO:  This documentation has been prepared by the Jama marquez Joy, SCRIBE, under my direction and personally reviewed by me in its entirety.  

I confirm that the documentation accurately reflects all work, treatment, 

procedures, and medical decision making performed by me.  





Attending Attestation





- Resident


Resident Name: Cheikh Andino





- ED Attending Attestation


I have performed the following: I have examined & evaluated the patient, The 

case was reviewed & discussed with the resident, I agree w/resident's findings 

& plan, Exceptions are as noted





- HPI


HPI: 





01/02/20 18:11


pt left prior to my evaluation





- Physicial Exam


PE: 





01/02/20 18:11


pt left prior to my evaluation





- Medical Decision Making





01/02/20 18:11


pt eloped prior to my evaluation

## 2020-01-02 NOTE — PDOC
History of Present Illness





- General


Chief Complaint: Alcohol intoxication


Stated Complaint: SENT BY PCP


Time Seen by Provider: 01/02/20 15:35


History Source: Patient





- History of Present Illness


Initial Comments: 





01/02/20 20:44


44M w/hx EtOH use disorder, depression, newly diagnosed MS presents referred by 

Dr. Finn for admission for detox, further inpatient neuro management of newly 

diagnosed MS. P/w hand shaking, feeling urge to drink interested in detox. He 

reports concern about MS but denies any difficulty ambulating, swallowing, or 

focal weakness at this time. 











Past History





- Past Medical History


Allergies/Adverse Reactions: 


 Allergies











Allergy/AdvReac Type Severity Reaction Status Date / Time


 


No Known Allergies Allergy   Verified 01/02/20 15:38











Home Medications: 


Ambulatory Orders





Clonazepam [Klonopin] 1 mg PO DAILY 05/04/18 








COPD: No


Psychiatric Problems: Yes (ANXIETY/DEPRESSION)


Seizures: Yes (last seisure 03/09/2019)


Other medical history: MS





- Surgical History


Abdominal Surgery: Yes


Appendectomy: Yes





- Psycho Social/Smoking Cessation Hx


Smoking Status: Yes


Smoking History: Current every day smoker


Have you smoked in the past 12 months: Yes


Number of Cigarettes Smoked Daily: 20


Information on smoking cessation initiated: No


Hx Alcohol Use: Yes


Drug/Substance Use Hx: No


Substance Use Type: Alcohol





*Physical Exam





- Vital Signs


 Last Vital Signs











Temp Pulse Resp BP Pulse Ox


 


 98 F   111 H  18   137/83   99 


 


 01/02/20 15:32  01/02/20 15:32  01/02/20 15:32  01/02/20 15:32  01/02/20 15:32














ED Treatment Course





- LABORATORY


CBC & Chemistry Diagram: 


 01/02/20 19:36





 01/02/20 19:36





- ADDITIONAL ORDERS


Additional order review: 


 Laboratory  Results











  01/02/20 01/02/20 01/02/20





  20:18 19:36 19:36


 


Sodium    142


 


Potassium    3.9


 


Chloride    106


 


Carbon Dioxide    29


 


Anion Gap    8


 


BUN    7.6


 


Creatinine    0.9


 


Est GFR (CKD-EPI)AfAm    119.13


 


Est GFR (CKD-EPI)NonAf    102.79


 


Random Glucose    85


 


Calcium    9.1


 


Magnesium   2.0 


 


Total Bilirubin    0.5


 


AST    22


 


ALT    29


 


Alkaline Phosphatase    71


 


Total Protein    8.1


 


Albumin    4.5


 


Opiates Screen  Negative  


 


Methadone Screen  Negative  


 


Barbiturate Screen  Negative  


 


Phencyclidine Screen  Negative  


 


Ur Amphetamines Screen  Negative  


 


MDMA (Ecstasy) Screen  Negative  


 


Benzodiazepines Screen  Negative  


 


Cocaine Screen  Negative  


 


U Marijuana (THC) Screen  Negative  


 


Alcohol, Quantitative    283.8 H








 











  01/02/20





  19:36


 


RBC  4.72


 


MCV  93.8


 


MCHC  34.2


 


RDW  15.4


 


MPV  8.4














- RADIOLOGY


Radiology Studies Ordered: 














 Category Date Time Status


 


 CHEST PA & LAT [RAD] Stat Radiology  01/02/20 18:52 Taken


 


 CHEST X-RAY PORTABLE* [RAD] Stat Radiology  01/02/20 18:34 Taken














- Medications


Given in the ED: 


ED Medications














Discontinued Medications














Generic Name Dose Route Start Last Admin





  Trade Name Freq  PRN Reason Stop Dose Admin


 


Lorazepam  1 mg  01/02/20 18:50  01/02/20 19:32





  Ativan Injection -  IVPUSH  01/02/20 18:51  1 mg





  ONCE ONE   Administration





     





     





     





     














Discharge





- Discharge Information


Clinical Impression/Diagnosis: 


 ETOH abuse





Condition: Stable





- Follow up/Referral


Referrals: 


Arlene Finn MD [Primary Care Provider] - 





- Patient Discharge Instructions





- Post Discharge Activity

## 2020-01-03 LAB
ANION GAP SERPL CALC-SCNC: 6 MMOL/L (ref 8–16)
BUN SERPL-MCNC: 11.6 MG/DL (ref 7–18)
CALCIUM SERPL-MCNC: 8 MG/DL (ref 8.5–10.1)
CHLORIDE SERPL-SCNC: 107 MMOL/L (ref 98–107)
CO2 SERPL-SCNC: 27 MMOL/L (ref 21–32)
CREAT SERPL-MCNC: 0.9 MG/DL (ref 0.55–1.3)
DEPRECATED RDW RBC AUTO: 15 % (ref 11.9–15.9)
GLUCOSE SERPL-MCNC: 74 MG/DL (ref 74–106)
HCT VFR BLD CALC: 36.1 % (ref 35.4–49)
HGB BLD-MCNC: 12.4 GM/DL (ref 11.7–16.9)
MAGNESIUM SERPL-MCNC: 1.6 MG/DL (ref 1.8–2.4)
MCH RBC QN AUTO: 32 PG (ref 25.7–33.7)
MCHC RBC AUTO-ENTMCNC: 34.3 G/DL (ref 32–35.9)
MCV RBC: 93.3 FL (ref 80–96)
PLATELET # BLD AUTO: 176 K/MM3 (ref 134–434)
PMV BLD: 8.4 FL (ref 7.5–11.1)
POTASSIUM SERPLBLD-SCNC: 4.1 MMOL/L (ref 3.5–5.1)
RBC # BLD AUTO: 3.88 M/MM3 (ref 4–5.6)
SODIUM SERPL-SCNC: 140 MMOL/L (ref 136–145)
WBC # BLD AUTO: 5.5 K/MM3 (ref 4–10)

## 2020-01-03 RX ADMIN — FOLIC ACID SCH MG: 1 TABLET ORAL at 12:01

## 2020-01-03 RX ADMIN — NICOTINE SCH: 7 PATCH TRANSDERMAL at 15:38

## 2020-01-03 RX ADMIN — POTASSIUM CHLORIDE AND SODIUM CHLORIDE SCH MLS/HR: 450; 150 INJECTION, SOLUTION INTRAVENOUS at 22:06

## 2020-01-03 RX ADMIN — THIAMINE HYDROCHLORIDE SCH MG: 100 INJECTION, SOLUTION INTRAMUSCULAR; INTRAVENOUS at 12:03

## 2020-01-03 RX ADMIN — POTASSIUM CHLORIDE AND SODIUM CHLORIDE SCH MLS/HR: 450; 150 INJECTION, SOLUTION INTRAVENOUS at 12:00

## 2020-01-03 RX ADMIN — HEPARIN SODIUM SCH UNIT: 5000 INJECTION, SOLUTION INTRAVENOUS; SUBCUTANEOUS at 12:01

## 2020-01-03 RX ADMIN — NICOTINE SCH MG: 21 PATCH TRANSDERMAL at 17:13

## 2020-01-03 RX ADMIN — HEPARIN SODIUM SCH: 5000 INJECTION, SOLUTION INTRAVENOUS; SUBCUTANEOUS at 21:31

## 2020-01-03 RX ADMIN — MULTIVITAMIN TABLET SCH TAB: TABLET at 12:02

## 2020-01-03 NOTE — EKG
Test Reason : 

Blood Pressure : ***/*** mmHG

Vent. Rate : 095 BPM     Atrial Rate : 095 BPM

   P-R Int : 132 ms          QRS Dur : 082 ms

    QT Int : 330 ms       P-R-T Axes : 046 062 049 degrees

   QTc Int : 414 ms

 

NORMAL SINUS RHYTHM

POSSIBLE ANTERIOR INFARCT (CITED ON OR BEFORE 04-MAY-2018)

ABNORMAL ECG

WHEN COMPARED WITH ECG OF 04-MAY-2018 17:52,

NO SIGNIFICANT CHANGE WAS FOUND

Confirmed by JACQUELIN ADAMS, TODD ([id number]) on 1/3/2020 1:21:14 PM

 

Referred By:             Confirmed By:TODD ROPER MD

## 2020-01-03 NOTE — CONSULT
Consult Detox [facility]


Reason for Current Admission/Consult: History of alcohol dependence, prior 

detox in past, intoxicated and interested in detox once again.


Referred by:: Hermelinda Post





- History


History of Present Illness: 





Eddie Moe is a 43yo man with a PMH of alcoholism, depression, and 

alcoholic seizure who presents requesting alcohol detox today. He states that 

he has been drinking more due to his recent diagnosis of MS.  However, he has 

had detox in 3/2019 at [facility] and endorses the same amount of alcohol 

despite not having that diagnosis back then.





He's had withdrawal seizures from attempting self-detox and was seen in ER also 

in 3/2019 for an apparent seizure from withdrawal.  This is a chronic problem 

for Mr. Moe.





He's denied any other substances of abuse in the past.








- History Source


History Provided By: Medical Record


Limitations to Obtaining History: No Limitations





- Alcohol/Substance Use


Hx Alcohol Use: Yes (2 pints of vodka daily)


Hx Substance Use: No


Hx Substance Use Treatment: Yes (2 detoxes in the past never rehab)





- Past Medical History


CNS: Yes: Multiple Sclerosis


Psych: Yes: Addictions (alcohol abuse), Anxiety, Depression





- Past Surgical History


Past Surgical History: Yes: Appendectomy





- Significant


Medical Findings: 





This is as per resident's exam:








Temperature  98.0 F   01/03/20 08:57


 


Pulse Rate  105 H  01/03/20 08:57


 


Respiratory Rate  18   01/03/20 08:57


 


Blood Pressure  129/80   01/03/20 08:57


 


O2 Sat by Pulse Oximetry (%)  97   01/03/20 00:40











Cardiovascular: Yes: S1, S2


Respiratory: Yes: Regular, CTA Bilaterally


Gastrointestinal: Yes: Normal Bowel Sounds, Soft.  No: Tenderness


Edema: No


Neurological: Yes: Alert, Oriented.  No: Pre-Existing Deficit





Assessment Plan





- Plan


Plan: 





1. Alcohol Dependence with intoxication:


Once Mr. Moe has been stabilized and is no longer intoxicated, he can be 

transferred to [facility] to either finish detox or go on the rehab if detox is 

completed within [place].


He has never attempted rehab and would benefit from the structured group 

therapy and training on relapse prevention.


Ultimately, he also has psychiatric co-morbid disorders either primarily or due 

to his medical issues so he needs psychiatric follow up and assessment.


This will ultimately impact on the success of detox and rehab if he is also co-

treated for his depression.


He would also benefit from ultimately a maintenance treatment program 

specifically for alcohol dependence and should consider vivitrol or other 

medication assisted treatment options for his alcohol dependence.


Continue Ativan Detox Protocol until he is stable to be transferred.








- Medication


Detox Regimen/Protocol: Ativan

## 2020-01-03 NOTE — CON.NEURO
Consult


Consult Specialty:: Bijan


Referred by:: Aakash


Reason for Consultation:: MS





- History of Present Illness


History of Present Illness: 





this is a very pleasant 44-year-old right-handed  man who I had the 

pleasure ofevaluating in the office at the request of his primary care 

physician 10 days ago before the holidays after he was recently diagnosed with 

multiple sclerosis.  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 

patient is not  has no children there is no family history of multiple 

sclerosis patienthad difficulty with the numbness and tingling patient 

presented to [facility] patient was admitted had a spinal tap MRI of the 

brain and the spine   No report of any recent travel unfortunately the patient 

was very depressed regarding the diagnosis ofmultiple reassurances that we have 

multiple options for treatment patient went intobinge drinkingpatient presented 

to the primary care physician asking for help patient was advised to go to the 

emergency room.





- History Source


History Provided By: Patient


Limitations to Obtaining History: No Limitations





- Past Medical History


CNS: Yes: Multiple Sclerosis


Psych: Yes: Addictions (alcohol abuse), Anxiety, Depression





- Past Surgical History


Past Surgical History: Yes: Appendectomy





- Alcohol/Substance Use


Hx Alcohol Use: Yes (2 pints of vodka daily)


History of Substance Use: reports: None





- Smoking History


Smoking history: Current every day smoker


Have you smoked in the past 12 months: Yes


Aproximately how many cigarettes per day: 20





- Social History


Usual Living Arrangement: With Parent


ADL: Independent


History of Recent Travel: No





Home Medications





- Allergies


Allergies/Adverse Reactions: 


 Allergies











Allergy/AdvReac Type Severity Reaction Status Date / Time


 


No Known Allergies Allergy   Verified 01/02/20 15:38














- Home Medications


Home Medications: 


Ambulatory Orders





Clonazepam [Klonopin] 1 mg PO DAILY 05/04/18 











Family Medical History


Family History: Unremarkable





Review of Systems





- Review of Systems


Constitutional: reports: No Symptoms


Eyes: reports: No Symptoms


Neurological: reports: Incoordination, Numbness, Parasthesia





Physical Exam-Neuro


Vital Signs: 


 Vital Signs











Temperature  98.7 F   01/03/20 18:00


 


Pulse Rate  84   01/03/20 18:00


 


Respiratory Rate  18   01/03/20 18:00


 


Blood Pressure  122/75   01/03/20 18:00


 


O2 Sat by Pulse Oximetry (%)  96   01/03/20 09:00











Constitutional: Yes: Well Nourished


Neck: Yes: WNL


Cardiovascular: Yes: WNL


Respiratory: Yes: WNL


Labs: 


 CBC, BMP





 01/03/20 07:15 





 01/03/20 07:15 











- Neuro Exam


Level Of Consciousness: Yes: Oriented to Person, Oriented to Place, Oriented to 

Time


Eyes: Yes: PERRLA


Speech: WNL


Dominant Hand: Right


Cranial Nerves II-XII Intact: Yes


Gag: Present


DTR's: 1+ Left Bicep, 1+ Right Bicep, 1+ Left Tricep, 1+ Right Tricep


Response to light touch: Normal


Response to pain prick: Normal


Response to temperature: Normal


Response to vibration: Normal


Motor Strength: 3/5: Left Arm, Right Arm, Left Leg, Right Leg


Gait: Deferred





Imaging





- Results


Cat Scan: Image Reviewed





Problem List





- Problems


(1) Multiple sclerosis


Assessment/Plan: 


atient with no sign of acute CNS pathology


Patient with establisheddiagnosis of relapsing remitting multiple sclerosis


I have had a lengthy conversation with the patient regarding options for 

treatment


Patient should start immune modulation as an outpatient


patient agreed that he cannot drink


Patient understands to risk associated with drinking and using of the immune 

modulator treatment for multiple sclerosis


Agreed to the psych consult


There is no need to start steroid for this admission 





thank you very much for allowing me to be part of this patient neurological care





Reji Thakkar M.D.


Code(s): G35 - MULTIPLE SCLEROSIS

## 2020-01-03 NOTE — CON.PSY
Psychiatry Consult


Chief Complaint: 44 Ywera old male seen for Psych eval, just diagnosed with MS. 

Feels anxious and feels bad about ity but denies any acute DEpression or 

suicidal ideas or Plans. Has supportive family.. mother. He ia a ppainter.


Symptoms: reports: Anxiety





- Previous Psychiatric Treatment


Outpatient: None


Inpatient: None





- Previous Substance Abuse Treatment


Outpatient: More than 6 mos ago


Inpatient: One prior admission





- Reason for Previous Treatment


Reason for Previous Treatment: Alcohol Abuse





- Current Medications


Current Medications: 


Active Medications





Folic Acid (Folic Acid -)  1 mg PO DAILY [facility]


   Last Admin: 01/03/20 12:01 Dose:  1 mg


Heparin Sodium (Porcine) (Heparin -)  5,000 unit SQ BID [facility]


   Last Admin: 01/03/20 12:01 Dose:  5,000 unit


Potassium Chloride/Sodium Chloride (1/2ns+20meq Kcl)  20 meq in 1,000 mls @ 125 

mls/hr IV ASDIR [facility]


   Last Admin: 01/03/20 12:00 Dose:  125 mls/hr


Lorazepam (Ativan -)  0.5 mg PO Q6H [facility]


   Stop: 01/05/20 23:01


Lorazepam (Ativan -)  0.5 mg PO Q4H PRN


   PRN Reason: Symptoms of Withdrawal


   Stop: 01/07/20 23:59


Lorazepam (Ativan -)  0.5 mg PO ONCE ONE


   Stop: 01/06/20 05:01


Lorazepam (Ativan -)  1 mg PO 0500,1100,1700,2300 [facility]


   Stop: 01/04/20 23:01


Lorazepam (Ativan -)  1 mg PO Q4H PRN


   PRN Reason: Symptoms of Withdrawal


   Stop: 01/04/20 23:59


   Last Admin: 01/02/20 23:56 Dose:  1 mg


Lorazepam (Ativan -)  2 mg PO 0500,1100,1700,2300 [facility]


   Stop: 01/03/20 23:01


   Last Admin: 01/03/20 12:02 Dose:  2 mg


Multivitamins/Minerals/Vitamin C (Tab-A-Vit -)  1 tab PO DAILY [facility]


   Last Admin: 01/03/20 12:02 Dose:  1 tab


Nicotine (Nicoderm Patch -)  21 mg TD DAILY [facility]


Thiamine HCl (Vitamin B1 Injection -)  200 mg IM DAILY [facility]


   Last Admin: 01/03/20 12:03 Dose:  200 mg











- Allergies


Allergies: 


 Allergies











Allergy/AdvReac Type Severity Reaction Status Date / Time


 


No Known Allergies Allergy   Verified 01/02/20 15:38














- Current Living Status


Usual Living Arrangement: With Parent





- Current Mental Status Evaluation


Appearance: Well Groomed


Attitude: Cooperative





- Affect


Affect: Constrictive


Appropriateness: Appropriate to Content





- Mood


Mood: Anxious





- Speech/Language


Expressive: Coherent





- Psychomotor Activity


Psychomotor Activity: Normal





- Thought Process


Thought Process: Intact





- Thought Content


Hallucinations: Absent


Delusions: Absent


Type: Grandiose





- Self Perception


Self Perception: No Impairment





- Cognition


Attention: Alert


Orientation: Time


Memory, Immediate Recall: Intact


Memory, Short Term: 3/3


Memory, Remote with Prompting: 3/3





- Concentration


Serial Sevens Intact: Yes


Simple Calculations Intact: Yes





- Abstraction


Proverb Interpretation: Intact


Judgement: Intact





- Insight


Insight: Intact





- Impulse Control


Impulse Control: Good Control





- Suicidal Ideation


Suicidal Ideation: No





- Homicidal Ideation


Homicidal Ideation: No





Assessment/Plan





1) Continue with Ativan.


2)Feels he does not need any anti depressants at this treatment. also refusing 

supportive Psychotherapy.

## 2020-01-03 NOTE — PN
Progress Note, Physician





- Current Medication List


Current Medications: 


Active Medications





Heparin Sodium (Porcine) (Heparin -)  5,000 unit SQ BID [facility]


Potassium Chloride/Sodium Chloride (1/2ns+20meq Kcl)  20 meq in 1,000 mls @ 125 

mls/hr IV ASDIR [facility]


Lorazepam (Ativan -)  0.5 mg PO Q6H [facility]


   Stop: 01/05/20 23:01


Lorazepam (Ativan -)  0.5 mg PO Q4H PRN


   PRN Reason: Symptoms of Withdrawal


   Stop: 01/07/20 23:59


Lorazepam (Ativan -)  0.5 mg PO ONCE ONE


   Stop: 01/06/20 05:01


Lorazepam (Ativan -)  1 mg PO 0500,1100,1700,2300 [facility]


   Stop: 01/04/20 23:01


Lorazepam (Ativan -)  1 mg PO Q4H PRN


   PRN Reason: Symptoms of Withdrawal


   Stop: 01/04/20 23:59


   Last Admin: 01/02/20 23:56 Dose:  1 mg


Lorazepam (Ativan -)  2 mg PO 0500,1100,1700,2300 [facility]


   Stop: 01/03/20 23:01


   Last Admin: 01/03/20 05:34 Dose:  2 mg


Nicotine (Nicoderm Patch -)  7 mg TD DAILY [facility]


   Last Admin: 01/02/20 22:48 Dose:  7 mg











- Objective


Vital Signs: 


 Vital Signs











Temperature  98.0 F   01/03/20 08:57


 


Pulse Rate  105 H  01/03/20 08:57


 


Respiratory Rate  18   01/03/20 08:57


 


Blood Pressure  129/80   01/03/20 08:57


 


O2 Sat by Pulse Oximetry (%)  97   01/03/20 00:40











Cardiovascular: Yes: S1, S2


Respiratory: Yes: Regular, CTA Bilaterally


Gastrointestinal: Yes: Normal Bowel Sounds, Soft.  No: Tenderness


Edema: No


Neurological: Yes: Alert, Oriented.  No: Pre-Existing Deficit





Problem List





- Problems


(1) ETOH abuse


Assessment/Plan: 





- Etoh: >200


- given banana bag, ativan in ED--start IVF


- follow up Mg+ level


- continue with ativan protocol


- follow up detox  


- seizure precaution 


- monitor for DTs 








Code(s): F10.10 - ALCOHOL ABUSE, UNCOMPLICATED   





(2) Depression


Assessment/Plan: 


-Psych Consult


Code(s): F32.9 - MAJOR DEPRESSIVE DISORDER, SINGLE EPISODE, UNSPECIFIED   





(3) Multiple sclerosis


Assessment/Plan: 





- no home meds


- follow up neurology 








Code(s): G35 - MULTIPLE SCLEROSIS

## 2020-01-04 RX ADMIN — POTASSIUM CHLORIDE AND SODIUM CHLORIDE SCH MLS/HR: 450; 150 INJECTION, SOLUTION INTRAVENOUS at 05:11

## 2020-01-04 RX ADMIN — HEPARIN SODIUM SCH: 5000 INJECTION, SOLUTION INTRAVENOUS; SUBCUTANEOUS at 22:06

## 2020-01-04 RX ADMIN — Medication SCH MG: at 23:00

## 2020-01-04 RX ADMIN — NICOTINE SCH MG: 21 PATCH TRANSDERMAL at 10:51

## 2020-01-04 RX ADMIN — POTASSIUM CHLORIDE AND SODIUM CHLORIDE SCH: 450; 150 INJECTION, SOLUTION INTRAVENOUS at 10:49

## 2020-01-04 RX ADMIN — THIAMINE HYDROCHLORIDE SCH MG: 100 INJECTION, SOLUTION INTRAMUSCULAR; INTRAVENOUS at 10:50

## 2020-01-04 RX ADMIN — FAMOTIDINE SCH MG: 20 TABLET ORAL at 12:49

## 2020-01-04 RX ADMIN — MULTIVITAMIN TABLET SCH TAB: TABLET at 10:50

## 2020-01-04 RX ADMIN — HEPARIN SODIUM SCH UNIT: 5000 INJECTION, SOLUTION INTRAVENOUS; SUBCUTANEOUS at 10:50

## 2020-01-04 RX ADMIN — FOLIC ACID SCH MG: 1 TABLET ORAL at 10:50

## 2020-01-04 NOTE — PN
Progress Note, Physician


Chief Complaint: 





AWAKE ALERT


EVENTS REVIEWED


FEELS ANXIOUS





- Current Medication List


Current Medications: 


Active Medications





Folic Acid (Folic Acid -)  1 mg PO DAILY [facility]


   Last Admin: 01/04/20 10:50 Dose:  1 mg


Heparin Sodium (Porcine) (Heparin -)  5,000 unit SQ BID [facility]


   Last Admin: 01/04/20 10:50 Dose:  5,000 unit


Potassium Chloride/Sodium Chloride (1/2ns+20meq Kcl)  20 meq in 1,000 mls @ 125 

mls/hr IV ASDIR [facility]


   Last Admin: 01/04/20 10:49 Dose:  Not Given


Lorazepam (Ativan -)  0.5 mg PO Q6H [facility]


   Stop: 01/05/20 23:01


Lorazepam (Ativan -)  0.5 mg PO Q4H PRN


   PRN Reason: Symptoms of Withdrawal


   Stop: 01/07/20 23:59


Lorazepam (Ativan -)  0.5 mg PO ONCE ONE


   Stop: 01/06/20 05:01


Lorazepam (Ativan -)  1 mg PO 0500,1100,1700,2300 [facility]


   Stop: 01/04/20 23:01


   Last Admin: 01/04/20 10:51 Dose:  1 mg


Lorazepam (Ativan -)  1 mg PO Q4H PRN


   PRN Reason: Symptoms of Withdrawal


   Stop: 01/04/20 23:59


   Last Admin: 01/02/20 23:56 Dose:  1 mg


Multivitamins/Minerals/Vitamin C (Tab-A-Vit -)  1 tab PO DAILY [facility]


   Last Admin: 01/04/20 10:50 Dose:  1 tab


Nicotine (Nicoderm Patch -)  21 mg TD DAILY [facility]


   Last Admin: 01/04/20 10:51 Dose:  21 mg


Thiamine HCl (Vitamin B1 Injection -)  200 mg IM DAILY [facility]


   Last Admin: 01/04/20 10:50 Dose:  200 mg











- Objective


Vital Signs: 


 Vital Signs











Temperature  97.5 F L  01/04/20 09:00


 


Pulse Rate  75   01/04/20 09:00


 


Respiratory Rate  20   01/04/20 09:00


 


Blood Pressure  122/76   01/04/20 09:00


 


O2 Sat by Pulse Oximetry (%)  96   01/03/20 21:00











Constitutional: Yes: Mild Distress


Cardiovascular: Yes: Regular Rate and Rhythm


Respiratory: Yes: WNL


Gastrointestinal: Yes: WNL


Genitourinary: Yes: WNL


Musculoskeletal: Yes: Muscle Weakness


Edema: No


Integumentary: Yes: WNL


Wound/Incision: Yes: Clean/Dry


Neurological: Yes: Pre-Existing Deficit, Weakness


...Motor Strength: LLE, RLE


Psychiatric: Yes: Other


Labs: 


 CBC, BMP





 01/03/20 07:15 





 01/03/20 07:15 











Problem List





- Problems


(1) Anxiety


Code(s): F41.9 - ANXIETY DISORDER, UNSPECIFIED   





(2) Depression


Code(s): F32.9 - MAJOR DEPRESSIVE DISORDER, SINGLE EPISODE, UNSPECIFIED   





(3) ETOH abuse


Code(s): F10.10 - ALCOHOL ABUSE, UNCOMPLICATED   





(4) Multiple sclerosis


Code(s): G35 - MULTIPLE SCLEROSIS   





Assessment/Plan





DC ALL IV MEDS/FLUIDS


ATIVAN PRN


ETOH PROTOCOL REHAB TX


OUTPATIENT GROUP CARE


DVT PROPHYLAXIS

## 2020-01-05 RX ADMIN — MAGNESIUM OXIDE TAB 400 MG (241.3 MG ELEMENTAL MG) SCH MG: 400 (241.3 MG) TAB at 21:52

## 2020-01-05 RX ADMIN — Medication SCH MG: at 11:35

## 2020-01-05 RX ADMIN — HEPARIN SODIUM SCH UNIT: 5000 INJECTION, SOLUTION INTRAVENOUS; SUBCUTANEOUS at 09:55

## 2020-01-05 RX ADMIN — HEPARIN SODIUM SCH: 5000 INJECTION, SOLUTION INTRAVENOUS; SUBCUTANEOUS at 21:52

## 2020-01-05 RX ADMIN — NICOTINE SCH MG: 21 PATCH TRANSDERMAL at 09:55

## 2020-01-05 RX ADMIN — FOLIC ACID SCH MG: 1 TABLET ORAL at 09:50

## 2020-01-05 RX ADMIN — MULTIVITAMIN TABLET SCH TAB: TABLET at 09:49

## 2020-01-05 RX ADMIN — FAMOTIDINE SCH MG: 20 TABLET ORAL at 09:49

## 2020-01-05 RX ADMIN — Medication SCH MG: at 21:52

## 2020-01-05 NOTE — PN
Progress Note, Physician


Chief Complaint: 





AWAKE ANXIOUS





- Current Medication List


Current Medications: 


Active Medications





Famotidine (Pepcid -)  20 mg PO DAILY [facility]


   Last Admin: 01/04/20 12:49 Dose:  20 mg


Folic Acid (Folic Acid -)  1 mg PO DAILY [facility]


   Last Admin: 01/04/20 10:50 Dose:  1 mg


Heparin Sodium (Porcine) (Heparin -)  5,000 unit SQ BID [facility]


   Last Admin: 01/04/20 22:06 Dose:  Not Given


Lorazepam (Ativan -)  0.5 mg PO Q6H [facility]


   Stop: 01/05/20 23:01


   Last Admin: 01/05/20 04:23 Dose:  0.5 mg


Lorazepam (Ativan -)  0.5 mg PO Q4H PRN


   PRN Reason: Symptoms of Withdrawal


   Stop: 01/07/20 23:59


Lorazepam (Ativan -)  0.5 mg PO ONCE ONE


   Stop: 01/06/20 05:01


Multivitamins/Minerals/Vitamin C (Tab-A-Vit -)  1 tab PO DAILY [facility]


   Last Admin: 01/04/20 10:50 Dose:  1 tab


Nicotine (Nicoderm Patch -)  21 mg TD DAILY [facility]


   Last Admin: 01/04/20 10:51 Dose:  21 mg


Paroxetine HCl (Paxil -)  10 mg PO DAILY [facility]


   Last Admin: 01/04/20 13:58 Dose:  Not Given


Thiamine HCl (Vitamin B1 -)  100 mg PO BID [facility]


   Last Admin: 01/04/20 23:00 Dose:  100 mg











- Objective


Vital Signs: 


 Vital Signs











Temperature  98.6 F   01/05/20 06:49


 


Pulse Rate  81   01/05/20 06:49


 


Respiratory Rate  20   01/05/20 06:49


 


Blood Pressure  131/84   01/05/20 06:49


 


O2 Sat by Pulse Oximetry (%)  96   01/04/20 21:00











Constitutional: Yes: Mild Distress


Cardiovascular: Yes: WNL


Respiratory: Yes: WNL


Gastrointestinal: Yes: WNL


Genitourinary: Yes: WNL


Musculoskeletal: Yes: WNL


Extremities: Yes: WNL


Peripheral Pulses WNL: Yes


Integumentary: Yes: WNL


Wound/Incision: Yes: Clean/Dry


Neurological: Yes: Pre-Existing Deficit


...Motor Strength: WNL


Psychiatric: Yes: Other


Labs: 


 CBC, BMP





 01/03/20 07:15 





 01/03/20 07:15 











Problem List





- Problems


(1) Anxiety


Code(s): F41.9 - ANXIETY DISORDER, UNSPECIFIED   





(2) Depression


Code(s): F32.9 - MAJOR DEPRESSIVE DISORDER, SINGLE EPISODE, UNSPECIFIED   





(3) ETOH abuse


Code(s): F10.10 - ALCOHOL ABUSE, UNCOMPLICATED   





(4) Multiple sclerosis


Code(s): G35 - MULTIPLE SCLEROSIS   





Assessment/Plan





DC ALL IV MEDS/FLUIDS


ATIVAN PRN


ETOH PROTOCOL REHAB TX


OUTPATIENT GROUP CARE


DVT PROPHYLAXIS


NEUROLOGY F/U FOR M.S.

## 2020-01-06 VITALS — SYSTOLIC BLOOD PRESSURE: 128 MMHG | HEART RATE: 108 BPM | TEMPERATURE: 97.6 F | DIASTOLIC BLOOD PRESSURE: 85 MMHG

## 2020-01-06 RX ADMIN — MULTIVITAMIN TABLET SCH TAB: TABLET at 10:00

## 2020-01-06 RX ADMIN — MAGNESIUM OXIDE TAB 400 MG (241.3 MG ELEMENTAL MG) SCH MG: 400 (241.3 MG) TAB at 10:00

## 2020-01-06 RX ADMIN — NICOTINE SCH MG: 21 PATCH TRANSDERMAL at 10:00

## 2020-01-06 RX ADMIN — FAMOTIDINE SCH MG: 20 TABLET ORAL at 10:00

## 2020-01-06 RX ADMIN — HEPARIN SODIUM SCH UNIT: 5000 INJECTION, SOLUTION INTRAVENOUS; SUBCUTANEOUS at 10:00

## 2020-01-06 RX ADMIN — FOLIC ACID SCH MG: 1 TABLET ORAL at 09:59

## 2020-01-06 RX ADMIN — Medication SCH MG: at 09:59

## 2020-01-06 NOTE — PN
Progress Note, Physician


Chief Complaint: 





Alcohol withdrawal


History of Present Illness: 





NAD 


Ambulating in the hallway


wants to go home


refuses inpatient rehab





- Current Medication List


Current Medications: 


Active Medications





Famotidine (Pepcid -)  20 mg PO DAILY [facility]


   Last Admin: 01/06/20 10:00 Dose:  20 mg


Folic Acid (Folic Acid -)  1 mg PO DAILY [facility]


   Last Admin: 01/06/20 09:59 Dose:  1 mg


Heparin Sodium (Porcine) (Heparin -)  5,000 unit SQ BID [facility]


   Last Admin: 01/06/20 10:00 Dose:  5,000 unit


Lorazepam (Ativan -)  1 mg PO TID [facility]


   Last Admin: 01/06/20 06:17 Dose:  1 mg


Magnesium Oxide (Mag-Ox -)  400 mg PO BID [facility]


   Last Admin: 01/06/20 10:00 Dose:  400 mg


Multivitamins/Minerals/Vitamin C (Tab-A-Vit -)  1 tab PO DAILY [facility]


   Last Admin: 01/06/20 10:00 Dose:  1 tab


Nicotine (Nicoderm Patch -)  21 mg TD DAILY [facility]


   Last Admin: 01/06/20 10:00 Dose:  21 mg


Paroxetine HCl (Paxil -)  10 mg PO DAILY [facility]


   Last Admin: 01/06/20 10:20 Dose:  10 mg


Thiamine HCl (Vitamin B1 -)  100 mg PO BID [facility]


   Last Admin: 01/06/20 09:59 Dose:  100 mg











- Objective


Vital Signs: 


 Vital Signs











Temperature  97.6 F   01/06/20 10:00


 


Pulse Rate  108 H  01/06/20 10:00


 


Respiratory Rate  18   01/06/20 10:00


 


Blood Pressure  128/85   01/06/20 10:00


 


O2 Sat by Pulse Oximetry (%)  99   01/06/20 09:00











Constitutional: Yes: Well Nourished, No Distress, Calm


Cardiovascular: Yes: Regular Rate and Rhythm


Respiratory: Yes: Regular


Gastrointestinal: Yes: Normal Bowel Sounds, Soft


Genitourinary: Yes: WNL


Musculoskeletal: Yes: WNL


Extremities: Yes: WNL


Edema: No


Peripheral Pulses WNL: Yes


Neurological: Yes: Alert, Oriented


Psychiatric: Yes: Alert, Oriented


Labs: 


 CBC, BMP





 01/03/20 07:15 





 01/03/20 07:15

## 2020-02-24 ENCOUNTER — HOSPITAL ENCOUNTER (EMERGENCY)
Dept: HOSPITAL 74 - JER | Age: 45
Discharge: HOME | End: 2020-02-24
Payer: COMMERCIAL

## 2020-02-24 VITALS — BODY MASS INDEX: 25 KG/M2

## 2020-02-24 VITALS — SYSTOLIC BLOOD PRESSURE: 133 MMHG | DIASTOLIC BLOOD PRESSURE: 84 MMHG | HEART RATE: 95 BPM | TEMPERATURE: 98 F

## 2020-02-24 DIAGNOSIS — M79.662: Primary | ICD-10-CM

## 2020-02-24 DIAGNOSIS — F10.10: ICD-10-CM

## 2020-02-24 DIAGNOSIS — M79.661: ICD-10-CM

## 2020-02-24 DIAGNOSIS — F41.8: ICD-10-CM

## 2020-02-24 DIAGNOSIS — F32.9: ICD-10-CM

## 2020-02-24 DIAGNOSIS — G35: ICD-10-CM

## 2020-02-24 NOTE — PDOC
Rapid Medical Evaluation


Medical Evaluation: 


 Allergies











Allergy/AdvReac Type Severity Reaction Status Date / Time


 


No Known Allergies Allergy   Verified 01/02/20 15:38











I have performed a brief in-person evaluation of this patient.


The patient presents with a chief complaint of: hx of MS c/o B/L leg pain from 

today (from heels to knees); states does not usually get this type of pain


Pertinent physical exam findings: In NAD, BLE unremarkable, no swelling, calf 

pain, normal color


I have ordered the following: Nothing


The patient will proceed to the ED for further evaluation.





02/24/20 18:58








**Discharge Disposition





- Referrals


Referrals: 


Arlene Finn MD [Primary Care Provider] - 





- Patient Instructions





- Post Discharge Activity

## 2020-02-24 NOTE — PDOC
History of Present Illness





- General


Chief Complaint: Pain


Stated Complaint: LEG PAIN


Time Seen by Provider: 02/24/20 18:57


History Source: Patient





- History of Present Illness


Initial Comments: 





02/24/20 22:17


44 year old male c/o b/l leg pain since 4 pm left is worse than right. denies 

weakness. patient reports pain is shooting up the left from heel to knee. 








patient also reports drinking alcohol this afternoon. no slurred speech.  











PMHX: MS


Neurology: Dr. thakkar








Past History





- Past Medical History


Allergies/Adverse Reactions: 


 Allergies











Allergy/AdvReac Type Severity Reaction Status Date / Time


 


No Known Allergies Allergy   Verified 02/24/20 19:00











Home Medications: 


Ambulatory Orders





Famotidine [Pepcid -] 20 mg PO DAILY #30 tablet 01/06/20 


Folic Acid - 1 mg PO DAILY #30 tablet 01/06/20 


Lorazepam [Ativan] 1 mg PO TID PRN 01/06/20 


Magnesium Oxide [Mag-Ox -] 400 mg PO BID #60 tablet 01/06/20 


Multivitamins [Multivit ([facility])] 1 tab PO DAILY #30 tab 01/06/20 


Nicotine Patch [Nicoderm Patch -] 21 mg TD DAILY #30 patch 01/06/20 


Paroxetine HCl [Paxil -] 10 mg PO DAILY #30 tablet 01/06/20 


Thiamine HCl [Vitamin B1 -] 100 mg PO BID #30 tablet 01/06/20 


Lidocaine 5% Patch [Lidoderm -] 1 patch TP DAILY PRN #30 patch 02/24/20 








COPD: No


Psychiatric Problems: Yes (ANXIETY/DEPRESSION)


Seizures: Yes (last seisure 03/09/2019)


Other medical history: MS





- Surgical History


Abdominal Surgery: Yes


Appendectomy: Yes





- Psycho Social/Smoking Cessation Hx


Smoking Status: Yes


Smoking History: Never smoked


Have you smoked in the past 12 months: Yes


Number of Cigarettes Smoked Daily: 20


'Breaking Loose' booklet given: 01/02/20


Hx Alcohol Use: Yes


Drug/Substance Use Hx: No


Substance Use Type: Alcohol


Hx Substance Use Treatment: Yes (2 detoxes in the past never rehab)





**Review of Systems





- Review of Systems


Able to Perform ROS?: Yes


Is the patient limited English proficient: No


Constitutional: No: Symptoms Reported, See HPI, Chills, Diaphoresis, Fever, 

Loss of Appetite, Malaise, Night Sweats, Weakness, Weight Stable, Unintentional 

Wgt. Loss, Unexplained wgt Loss, Other





*Physical Exam





- Vital Signs


 Last Vital Signs











Temp Pulse Resp BP Pulse Ox


 


 98 F   95 H  18   133/84   99 


 


 02/24/20 18:57  02/24/20 18:57  02/24/20 18:57  02/24/20 18:57  02/24/20 18:57














- Physical Exam


General Appearance: Yes: Appropriately Dressed


Respiratory/Chest: positive: Lungs Clear, Normal Breath Sounds


Neurologic: positive: CNs II-XII NML intact, Fully Oriented, Alert, Normal Mood/

Affect, Normal Response, Motor Strength 5/5, Other (heel pain)





ED Progress Note





- Progress Note


Progress Note: 





02/25/20 06:06


A: leg pain








P: lidocaine patch








patient refused tylenol





Discharge





- Discharge Information


Problems reviewed: Yes


Clinical Impression/Diagnosis: 


 Leg pain, bilateral





Condition: Stable


Disposition: HOME





- Additional Discharge Information


Prescriptions: 


Lidocaine 5% Patch [Lidoderm -] 1 patch TP DAILY PRN #30 patch


 PRN Reason: Pain





- Follow up/Referral


Referrals: 


Arlene Finn MD [Primary Care Provider] - 


Reji Thakkar MD [Staff Physician] - 





- Patient Discharge Instructions


Patient Printed Discharge Instructions:  DI for Leg Pain


Additional Instructions: 


apply lidocaine patch to the foot once daily as needed








use compression stocking








follow up with your doctor as soon as possible. 





- Post Discharge Activity


Work/Back to School Note:  Back to Work

## 2022-01-14 ENCOUNTER — HOSPITAL ENCOUNTER (OUTPATIENT)
Dept: HOSPITAL 74 - FASU | Age: 47
Discharge: HOME | End: 2022-01-14
Attending: ORTHOPAEDIC SURGERY
Payer: COMMERCIAL

## 2022-01-14 VITALS — DIASTOLIC BLOOD PRESSURE: 68 MMHG | HEART RATE: 77 BPM | SYSTOLIC BLOOD PRESSURE: 138 MMHG

## 2022-01-14 VITALS — BODY MASS INDEX: 25.4 KG/M2

## 2022-01-14 VITALS — TEMPERATURE: 97.8 F

## 2022-01-14 DIAGNOSIS — M75.01: ICD-10-CM
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
## 2022-01-14 DIAGNOSIS — S43.431A: ICD-10-CM

## 2022-01-14 DIAGNOSIS — M65.811: ICD-10-CM
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
## 2022-01-14 DIAGNOSIS — M75.121: Primary | ICD-10-CM

## 2022-01-14 PROCEDURE — 0RBJ4ZZ EXCISION OF RIGHT SHOULDER JOINT, PERCUTANEOUS ENDOSCOPIC APPROACH: ICD-10-PCS | Performed by: ORTHOPAEDIC SURGERY
